# Patient Record
Sex: FEMALE | Race: WHITE | HISPANIC OR LATINO | Employment: FULL TIME | URBAN - METROPOLITAN AREA
[De-identification: names, ages, dates, MRNs, and addresses within clinical notes are randomized per-mention and may not be internally consistent; named-entity substitution may affect disease eponyms.]

---

## 2021-03-24 PROCEDURE — 96365 THER/PROPH/DIAG IV INF INIT: CPT

## 2021-03-24 PROCEDURE — 99284 EMERGENCY DEPT VISIT MOD MDM: CPT

## 2021-03-24 PROCEDURE — 96375 TX/PRO/DX INJ NEW DRUG ADDON: CPT

## 2021-03-25 ENCOUNTER — HOSPITAL ENCOUNTER (EMERGENCY)
Facility: MEDICAL CENTER | Age: 36
End: 2021-03-25
Attending: EMERGENCY MEDICINE
Payer: MEDICAID

## 2021-03-25 VITALS
BODY MASS INDEX: 37.94 KG/M2 | HEART RATE: 97 BPM | DIASTOLIC BLOOD PRESSURE: 85 MMHG | TEMPERATURE: 97.5 F | HEIGHT: 64 IN | WEIGHT: 222.22 LBS | OXYGEN SATURATION: 98 % | SYSTOLIC BLOOD PRESSURE: 123 MMHG | RESPIRATION RATE: 14 BRPM

## 2021-03-25 DIAGNOSIS — N30.00 ACUTE CYSTITIS WITHOUT HEMATURIA: ICD-10-CM

## 2021-03-25 LAB
ALBUMIN SERPL BCP-MCNC: 4.2 G/DL (ref 3.2–4.9)
ALBUMIN/GLOB SERPL: 1.4 G/DL
ALP SERPL-CCNC: 80 U/L (ref 30–99)
ALT SERPL-CCNC: 34 U/L (ref 2–50)
ANION GAP SERPL CALC-SCNC: 12 MMOL/L (ref 7–16)
APPEARANCE UR: ABNORMAL
AST SERPL-CCNC: 25 U/L (ref 12–45)
BACTERIA #/AREA URNS HPF: ABNORMAL /HPF
BASOPHILS # BLD AUTO: 0.2 % (ref 0–1.8)
BASOPHILS # BLD: 0.02 K/UL (ref 0–0.12)
BILIRUB SERPL-MCNC: 0.7 MG/DL (ref 0.1–1.5)
BILIRUB UR QL STRIP.AUTO: NEGATIVE
BUN SERPL-MCNC: 7 MG/DL (ref 8–22)
CALCIUM SERPL-MCNC: 9.6 MG/DL (ref 8.5–10.5)
CHLORIDE SERPL-SCNC: 97 MMOL/L (ref 96–112)
CO2 SERPL-SCNC: 25 MMOL/L (ref 20–33)
COLOR UR: YELLOW
CREAT SERPL-MCNC: 0.75 MG/DL (ref 0.5–1.4)
EOSINOPHIL # BLD AUTO: 0.1 K/UL (ref 0–0.51)
EOSINOPHIL NFR BLD: 1.1 % (ref 0–6.9)
EPI CELLS #/AREA URNS HPF: ABNORMAL /HPF
ERYTHROCYTE [DISTWIDTH] IN BLOOD BY AUTOMATED COUNT: 39.8 FL (ref 35.9–50)
GLOBULIN SER CALC-MCNC: 3.1 G/DL (ref 1.9–3.5)
GLUCOSE SERPL-MCNC: 102 MG/DL (ref 65–99)
GLUCOSE UR STRIP.AUTO-MCNC: NEGATIVE MG/DL
HCG SERPL QL: NEGATIVE
HCT VFR BLD AUTO: 45.8 % (ref 37–47)
HGB BLD-MCNC: 15.2 G/DL (ref 12–16)
HYALINE CASTS #/AREA URNS LPF: ABNORMAL /LPF
IMM GRANULOCYTES # BLD AUTO: 0.03 K/UL (ref 0–0.11)
IMM GRANULOCYTES NFR BLD AUTO: 0.3 % (ref 0–0.9)
KETONES UR STRIP.AUTO-MCNC: 15 MG/DL
LACTATE BLD-SCNC: 1.4 MMOL/L (ref 0.5–2)
LEUKOCYTE ESTERASE UR QL STRIP.AUTO: ABNORMAL
LYMPHOCYTES # BLD AUTO: 3.23 K/UL (ref 1–4.8)
LYMPHOCYTES NFR BLD: 36.4 % (ref 22–41)
MCH RBC QN AUTO: 27.5 PG (ref 27–33)
MCHC RBC AUTO-ENTMCNC: 33.2 G/DL (ref 33.6–35)
MCV RBC AUTO: 82.8 FL (ref 81.4–97.8)
MICRO URNS: ABNORMAL
MONOCYTES # BLD AUTO: 0.57 K/UL (ref 0–0.85)
MONOCYTES NFR BLD AUTO: 6.4 % (ref 0–13.4)
NEUTROPHILS # BLD AUTO: 4.92 K/UL (ref 2–7.15)
NEUTROPHILS NFR BLD: 55.6 % (ref 44–72)
NITRITE UR QL STRIP.AUTO: POSITIVE
NRBC # BLD AUTO: 0 K/UL
NRBC BLD-RTO: 0 /100 WBC
PH UR STRIP.AUTO: 7.5 [PH] (ref 5–8)
PLATELET # BLD AUTO: 334 K/UL (ref 164–446)
PMV BLD AUTO: 9.7 FL (ref 9–12.9)
POTASSIUM SERPL-SCNC: 3.3 MMOL/L (ref 3.6–5.5)
PROT SERPL-MCNC: 7.3 G/DL (ref 6–8.2)
PROT UR QL STRIP: NEGATIVE MG/DL
RBC # BLD AUTO: 5.53 M/UL (ref 4.2–5.4)
RBC # URNS HPF: ABNORMAL /HPF
RBC UR QL AUTO: NEGATIVE
SODIUM SERPL-SCNC: 134 MMOL/L (ref 135–145)
SP GR UR STRIP.AUTO: 1.02
UROBILINOGEN UR STRIP.AUTO-MCNC: 0.2 MG/DL
WBC # BLD AUTO: 8.9 K/UL (ref 4.8–10.8)
WBC #/AREA URNS HPF: ABNORMAL /HPF

## 2021-03-25 PROCEDURE — 81001 URINALYSIS AUTO W/SCOPE: CPT

## 2021-03-25 PROCEDURE — 87086 URINE CULTURE/COLONY COUNT: CPT

## 2021-03-25 PROCEDURE — 87077 CULTURE AEROBIC IDENTIFY: CPT | Mod: 91

## 2021-03-25 PROCEDURE — 83605 ASSAY OF LACTIC ACID: CPT

## 2021-03-25 PROCEDURE — 700105 HCHG RX REV CODE 258: Performed by: EMERGENCY MEDICINE

## 2021-03-25 PROCEDURE — 80053 COMPREHEN METABOLIC PANEL: CPT

## 2021-03-25 PROCEDURE — 84703 CHORIONIC GONADOTROPIN ASSAY: CPT

## 2021-03-25 PROCEDURE — 87186 SC STD MICRODIL/AGAR DIL: CPT

## 2021-03-25 PROCEDURE — 700111 HCHG RX REV CODE 636 W/ 250 OVERRIDE (IP): Performed by: EMERGENCY MEDICINE

## 2021-03-25 PROCEDURE — 85025 COMPLETE CBC W/AUTO DIFF WBC: CPT

## 2021-03-25 RX ORDER — SODIUM CHLORIDE 9 MG/ML
1000 INJECTION, SOLUTION INTRAVENOUS ONCE
Status: COMPLETED | OUTPATIENT
Start: 2021-03-25 | End: 2021-03-25

## 2021-03-25 RX ORDER — ONDANSETRON 2 MG/ML
4 INJECTION INTRAMUSCULAR; INTRAVENOUS ONCE
Status: COMPLETED | OUTPATIENT
Start: 2021-03-25 | End: 2021-03-25

## 2021-03-25 RX ORDER — CEFDINIR 300 MG/1
300 CAPSULE ORAL 2 TIMES DAILY
Qty: 14 CAPSULE | Refills: 0 | Status: SHIPPED | OUTPATIENT
Start: 2021-03-25 | End: 2021-04-01

## 2021-03-25 RX ORDER — ONDANSETRON 4 MG/1
4 TABLET, ORALLY DISINTEGRATING ORAL EVERY 6 HOURS PRN
Qty: 10 TABLET | Refills: 0 | Status: SHIPPED | OUTPATIENT
Start: 2021-03-25 | End: 2021-09-22

## 2021-03-25 RX ADMIN — ONDANSETRON 4 MG: 2 INJECTION INTRAMUSCULAR; INTRAVENOUS at 01:27

## 2021-03-25 RX ADMIN — SODIUM CHLORIDE 1000 ML: 9 INJECTION, SOLUTION INTRAVENOUS at 01:27

## 2021-03-25 RX ADMIN — CEFTRIAXONE SODIUM 2 G: 2 INJECTION, POWDER, FOR SOLUTION INTRAMUSCULAR; INTRAVENOUS at 01:55

## 2021-03-25 NOTE — DISCHARGE INSTRUCTIONS
You were seen in the ER for nausea and urinary tract infection.  We did give you a dose of antibiotics to your IV and I am giving a prescription for antibiotics to go home with.  Please take these as directed.  It will be important that you follow-up with your primary care physician, I gave you a list of local clinics we can do this, please call 1 later today to make a follow-up appointment.  Return to the ER with new or worsening symptoms.  Good luck, I hope you feel better soon!

## 2021-03-25 NOTE — ED TRIAGE NOTES
"Ann Singh Ase  35 y.o.  Chief Complaint   Patient presents with   • Dehydration     pt reports gastric sleeve 2 years ago; reports GI illness 2 months ago which \"shrunk her gastric pouch\"; pt reports she got a new job at [employer] where she isn't allowed to keep water with her; pt reports she continuously has to leave to go get water; pt reports constant nausea and acid reflux after eating; pt currently drinking a bottle of water in triage   • Urinary Frequency     pt reports she thinks she has a UTI r/t urinary frequency/urgency and intermittent R flank pain     "

## 2021-03-25 NOTE — LETTER
3/28/2021               Flor [facility]  [address] [facility]  Room [address] [place] [address]        Dear Flor (MR#[id number])    This letter is sent in regards to your, recent visit to the [facility] Emergency Department on 3/24/2021.  During the visit, tests were performed to assist the physician in a medical diagnosis.  A review of those tests requires that we notify you of the following:    Your urine culture was POSITIVE for a bacteria called Escherichia coli. The antibiotic prescribed for you (cefdinir) should be active to treat this bacteria. IT IS IMPORTANT THAT YOU CONTINUE TAKING YOUR ANTIBIOTIC UNTIL IT IS FINISHED.      Please feel free to contact me at the number below if you have any questions or concerns. Thank you for your cooperation in the matter.    Sincerely,  ED Culture Follow-Up Staff  Eliu Beltre PharmD    [facility], Emergency Department  [address] [place] [address]  [phone] (ED Culture Line)  [phone]

## 2021-03-25 NOTE — ED NOTES
Rounded on patient. Denies changes since triage note. Patient irritable she has yet to be seen, empathetic listening, apologized for wait times

## 2021-03-25 NOTE — ED PROVIDER NOTES
"ED Provider Note    CHIEF COMPLAINT  Chief Complaint   Patient presents with   • Dehydration     pt reports gastric sleeve 2 years ago; reports GI illness 2 months ago which \"shrunk her gastric pouch\"; pt reports she got a new job at [employer] where she isn't allowed to keep water with her; pt reports she continuously has to leave to go get water; pt reports constant nausea and acid reflux after eating; pt currently drinking a bottle of water in triage   • Urinary Frequency     pt reports she thinks she has a UTI r/t urinary frequency/urgency and intermittent R flank pain       HPI  Ann Camara is a 35 y.o. female who presents with a chief complaint of dehydration.  The patient reports she had a gastric sleeve procedure performed 2 years ago.  In early March prior to her move to the [facility], she developed a GI illness that \"shrunk my pouch\".  Since that time she has had to take multiple and frequent sips of water and is unable to drink large amounts of water to keep herself hydrated.  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  Her symptoms were associated with some nausea, dry heaving, and lightheadedness.  She then began to hyperventilate.  This has happened to her before when she was dehydrated.  In addition she complains of urinary frequency, urgency, and dysuria which is consistent with prior episodes of urinary tract infection.  No vaginal discharge or concern for STD.  She is still slightly nauseated but reports all other symptoms have resolved.    REVIEW OF SYSTEMS  See HPI for further details.  Dysuria.  Urinary frequency.  Urinary urgency.  Nausea.  Dry heaving.  Dehydration.  All other systems are negative.     PAST MEDICAL HISTORY   has a past medical history of PCOS (polycystic ovarian syndrome).    SOCIAL HISTORY  Social History     Tobacco Use   • Smoking status: Current Every Day Smoker   • Smokeless tobacco: Never Used " "  Substance and Sexual Activity   • Alcohol use: Not Currently   • Drug use: Never   • Sexual activity: Not on file       SURGICAL HISTORY   has a past surgical history that includes gastric banding laparoscopic; cholecystectomy; and dilation and curettage.    CURRENT MEDICATIONS  Home Medications    **Home medications have not yet been reviewed for this encounter**         ALLERGIES  Allergies   Allergen Reactions   • Tape      \"surgical tape\"       PHYSICAL EXAM  VITAL SIGNS: /93   Pulse 91   Temp 36.5 °C (97.7 °F) (Temporal)   Resp 18   Ht 1.626 m (5' 4\")   Wt 101 kg (222 lb 3.6 oz)   SpO2 99%   BMI 38.14 kg/m²    Pulse ox interpretation: I interpret this pulse ox as normal.  Constitutional: Alert in no apparent distress.  HENT: No signs of trauma, Bilateral external ears normal, Nose normal.  Dry mucous membranes.  Eyes: Pupils are equal and reactive, Conjunctiva normal, Non-icteric.   Neck: Normal range of motion, No tenderness, Supple, No stridor.   Lymphatic: No lymphadenopathy noted.   Cardiovascular: Regular rate and rhythm, no murmurs. Pulses symmetrical.  Thorax & Lungs: Normal breath sounds, No respiratory distress, No wheezing, No chest tenderness.   Abdomen: Bowel sounds normal, Soft, No tenderness, No masses, No pulsatile masses. No peritoneal signs.  Skin: Warm, Dry, No erythema, No rash.   Back: No bony tenderness, no CVA tenderness.   Extremities: Intact distal pulses, No edema, No tenderness, No cyanosis.  Musculoskeletal: Good range of motion in all major joints. No tenderness to palpation or major deformities noted.   Neurologic: Alert, Normal motor function, Normal sensory function, No focal deficits noted.   Psychiatric: Affect normal, Judgment normal, Mood normal.     DIAGNOSTIC STUDIES / PROCEDURES    LABS  Results for orders placed or performed during the hospital encounter of 03/25/21   URINALYSIS,CULTURE IF INDICATED    Specimen: Urine, Clean Catch   Result Value Ref Range    " Color Yellow     Character Cloudy (A)     Specific Gravity 1.021 <1.035    Ph 7.5 5.0 - 8.0    Glucose Negative Negative mg/dL    Ketones 15 (A) Negative mg/dL    Protein Negative Negative mg/dL    Bilirubin Negative Negative    Urobilinogen, Urine 0.2 Negative    Nitrite Positive (A) Negative    Leukocyte Esterase Small (A) Negative    Occult Blood Negative Negative    Micro Urine Req Microscopic    CBC WITH DIFFERENTIAL   Result Value Ref Range    WBC 8.9 4.8 - 10.8 K/uL    RBC 5.53 (H) 4.20 - 5.40 M/uL    Hemoglobin 15.2 12.0 - 16.0 g/dL    Hematocrit 45.8 37.0 - 47.0 %    MCV 82.8 81.4 - 97.8 fL    MCH 27.5 27.0 - 33.0 pg    MCHC 33.2 (L) 33.6 - 35.0 g/dL    RDW 39.8 35.9 - 50.0 fL    Platelet Count 334 164 - 446 K/uL    MPV 9.7 9.0 - 12.9 fL    Neutrophils-Polys 55.60 44.00 - 72.00 %    Lymphocytes 36.40 22.00 - 41.00 %    Monocytes 6.40 0.00 - 13.40 %    Eosinophils 1.10 0.00 - 6.90 %    Basophils 0.20 0.00 - 1.80 %    Immature Granulocytes 0.30 0.00 - 0.90 %    Nucleated RBC 0.00 /100 WBC    Neutrophils (Absolute) 4.92 2.00 - 7.15 K/uL    Lymphs (Absolute) 3.23 1.00 - 4.80 K/uL    Monos (Absolute) 0.57 0.00 - 0.85 K/uL    Eos (Absolute) 0.10 0.00 - 0.51 K/uL    Baso (Absolute) 0.02 0.00 - 0.12 K/uL    Immature Granulocytes (abs) 0.03 0.00 - 0.11 K/uL    NRBC (Absolute) 0.00 K/uL   HCG QUAL SERUM   Result Value Ref Range    Beta-Hcg Qualitative Serum Negative Negative   COMP METABOLIC PANEL   Result Value Ref Range    Sodium 134 (L) 135 - 145 mmol/L    Potassium 3.3 (L) 3.6 - 5.5 mmol/L    Chloride 97 96 - 112 mmol/L    Co2 25 20 - 33 mmol/L    Anion Gap 12.0 7.0 - 16.0    Glucose 102 (H) 65 - 99 mg/dL    Bun 7 (L) 8 - 22 mg/dL    Creatinine 0.75 0.50 - 1.40 mg/dL    Calcium 9.6 8.5 - 10.5 mg/dL    AST(SGOT) 25 12 - 45 U/L    ALT(SGPT) 34 2 - 50 U/L    Alkaline Phosphatase 80 30 - 99 U/L    Total Bilirubin 0.7 0.1 - 1.5 mg/dL    Albumin 4.2 3.2 - 4.9 g/dL    Total Protein 7.3 6.0 - 8.2 g/dL    Globulin 3.1 1.9  - 3.5 g/dL    A-G Ratio 1.4 g/dL   LACTIC ACID   Result Value Ref Range    Lactic Acid 1.4 0.5 - 2.0 mmol/L   URINE MICROSCOPIC (W/UA)   Result Value Ref Range    WBC 20-50 (A) /hpf    RBC 0-2 /hpf    Bacteria Many (A) None /hpf    Epithelial Cells Few /hpf    Hyaline Cast 6-10 (A) /lpf   ESTIMATED GFR   Result Value Ref Range    GFR If African American >60 >60 mL/min/1.73 m 2    GFR If Non African American >60 >60 mL/min/1.73 m 2     COURSE & MEDICAL DECISION MAKING  Pertinent Labs & Imaging studies reviewed. (See chart for details)  This is a 35-year-old female with history of gastric sleeve surgery who is here with reports of dehydration as well as symptoms consistent with urinary tract infection.  She arrives afebrile with normal vital signs.  She appears dehydrated with dry mucous membranes but nontoxic.  Physical exam is benign.  Abdomen is soft and nontender.  We will start with IV fluids for dehydration, obtain basic labs, try some Zofran for nausea, and obtain urinalysis to evaluate for UTI.    CBC without leukocytosis or left shift.  Metabolic panel reveals mild hyponatremia to 134, hypokalemia to 3.3, glucose of 102.  UA reveals ketones and does suggest infection.  hCG is negative.  Patient was given a dose of ceftriaxone through the IV.    Patient able to tolerate p.o. without difficulty.  She is safe for discharge with close outpatient management.  She will follow-up with her HR department to get a note that allows her to keep water with her in order to stay hydrated throughout the day.  She was given a prescription for Omnicef and Zofran.  Discharged in good and stable condition with strict return precautions.  Vital signs remain normal and stable.    The patient will not drink alcohol nor drive with prescribed medications. The patient will return for worsening symptoms and is stable at the time of discharge. The patient verbalizes understanding and will comply.    HYDRATION  HYDRATION: Based on the  patient's presentation of Dehydration the patient was given IV fluids. IV Hydration was used because oral hydration was not adequate alone. Upon recheck following hydration, the patient was Improved.    FINAL IMPRESSION  1. Acute cystitis without hematuria             Electronically signed by: Juni Cano M.D., 3/25/2021 12:58 AM

## 2021-03-28 LAB
BACTERIA UR CULT: ABNORMAL
BACTERIA UR CULT: ABNORMAL
SIGNIFICANT IND 70042: ABNORMAL
SITE SITE: ABNORMAL
SOURCE SOURCE: ABNORMAL

## 2021-03-28 NOTE — ED NOTES
"ED Positive Culture Follow-up/Notification Note:    Date: 3/28/21     Patient seen in the ED on 3/24/2021 for dehydration and urinary frequency/urgency/dysuria. Her sx were associated w/ some nausea, dry heaving, and lightheadedness. Pt has hx of UTI's in which she noted to be similar to her current sx. No vaginal discharge or concern for STD. Pt was AF upon presentation.  1. Acute cystitis without hematuria       Discharge Medication List as of 3/25/2021  2:22 AM      START taking these medications    Details   cefdinir (OMNICEF) 300 MG Cap Take 1 capsule by mouth 2 times a day for 7 days., Disp-14 capsule, R-0, Print Rx Paper      ondansetron (ZOFRAN ODT) 4 MG TABLET DISPERSIBLE Take 1 tablet by mouth every 6 hours as needed., Disp-10 tablet, R-0, Print Rx Paper           ED administered abx: CTX 2g IV x 1     Allergies: Tape     Vitals:    03/24/21 2054 03/24/21 2105 03/24/21 2325 03/25/21 0200   BP: 156/103  123/93 123/85   Pulse: (!) 120  91 97   Resp: 14  18 14   Temp: 36.1 °C (97 °F)  36.5 °C (97.7 °F) 36.4 °C (97.5 °F)   TempSrc: Temporal  Temporal Temporal   SpO2: 99%  99% 98%   Weight:  101 kg (222 lb 3.6 oz)     Height:  1.626 m (5' 4\")         Final cultures:   Results     Procedure Component Value Units Date/Time    URINE CULTURE(NEW) [[id number]]  (Abnormal)  (Susceptibility) Collected: 03/25/21 0105    Order Status: Completed Specimen: Urine Updated: 03/28/21 1231     Significant Indicator POS     Source UR     Site -     Culture Result -      Escherichia coli  >100,000 cfu/mL      Narrative:      Indication for culture:->Patient WITHOUT an indwelling Melgar  catheter in place with new onset of Dysuria, Frequency,  Urgency, and/or Suprapubic pain    Susceptibility     Escherichia coli (1)     Antibiotic Interpretation Microscan Method Status    Amikacin [*]  Sensitive <=16 mcg/mL ORSANNA Final    Ampicillin Resistant >16 mcg/mL ROSANNA Final    Amoxicillin/CA [*]  Sensitive <=8/4 mcg/mL ROSANNA Final    Aztreonam " [*]  Sensitive <=4 mcg/mL ROSANNA Final    Ceftolozane+Tazobactam [*]  Sensitive <=2 mcg/mL ROSANNA Final    Ceftriaxone Sensitive <=1 mcg/mL ROSANNA Final    Ceftazidime Sensitive <=1 mcg/mL ROSANNA Final    Cefotaxime Sensitive <=2 mcg/mL ROSANNA Final    Cefazolin Intermediate 16 mcg/mL ROSANNA Final    Ciprofloxacin Sensitive <=0.25 mcg/mL ROSANNA Final    Cefepime Sensitive <=2 mcg/mL ROSANNA Final    Cefuroxime Sensitive 8 mcg/mL ROSANNA Final    Ampicillin/sulbactam Resistant >16/8 mcg/mL ROSANNA Final    Ceftazidime+Avibactam [*]  Sensitive <=4 mcg/mL ROSANNA Final    Tobramycin Sensitive <=2 mcg/mL ROSANNA Final    Ertapenem [*]  Sensitive <=0.5 mcg/mL ROSANNA Final    Nitrofurantoin Sensitive <=32 mcg/mL ROSANNA Final    Gentamicin Sensitive <=2 mcg/mL ROSANNA Final    Imipenem [*]  Sensitive <=1 mcg/mL ROSANNA Final    Levofloxacin Sensitive <=0.5 mcg/mL ROSANNA Final    Meropenem [*]  Sensitive <=1 mcg/mL ROSANNA Final    Meropenem/Vaborbactam [*]  Sensitive <=2 mcg/mL ROSANNA Final    Pip/Tazobactam Sensitive <=8 mcg/mL ROSANNA Final    Trimeth/Sulfa Sensitive <=0.5/9.5 mcg/mL ROSANNA Final    Tetracycline [*]  Sensitive <=4 mcg/mL ROSANNA Final    Tigecycline Sensitive <=2 mcg/mL ROSANNA Final           [*]   Suppressed Antibiotic                 URINALYSIS,CULTURE IF INDICATED [[id number]]  (Abnormal) Collected: 03/25/21 0105    Order Status: Completed Specimen: Urine, Clean Catch Updated: 03/25/21 0147     Color Yellow     Character Cloudy     Specific Gravity 1.021     Ph 7.5     Glucose Negative mg/dL      Ketones 15 mg/dL      Protein Negative mg/dL      Bilirubin Negative     Urobilinogen, Urine 0.2     Nitrite Positive     Leukocyte Esterase Small     Occult Blood Negative     Micro Urine Req Microscopic    Narrative:      Indication for culture:->Patient WITHOUT an indwelling Melgar  catheter in place with new onset of Dysuria, Frequency,  Urgency, and/or Suprapubic pain          Plan:   Appropriate antibiotic therapy prescribed. No changes required based upon culture result.  Sent  letter to patient to notify of positive culture result and encourage compliance with prescribed antibiotics.     Eliu Beltre, CristianeD

## 2021-05-15 ENCOUNTER — APPOINTMENT (OUTPATIENT)
Dept: RADIOLOGY | Facility: IMAGING CENTER | Age: 36
End: 2021-05-15
Attending: FAMILY MEDICINE
Payer: COMMERCIAL

## 2021-05-15 ENCOUNTER — OCCUPATIONAL MEDICINE (OUTPATIENT)
Dept: URGENT CARE | Facility: PHYSICIAN GROUP | Age: 36
End: 2021-05-15
Payer: COMMERCIAL

## 2021-05-15 VITALS
TEMPERATURE: 97.8 F | HEART RATE: 98 BPM | RESPIRATION RATE: 18 BRPM | SYSTOLIC BLOOD PRESSURE: 124 MMHG | OXYGEN SATURATION: 97 % | HEIGHT: 64 IN | WEIGHT: 204 LBS | DIASTOLIC BLOOD PRESSURE: 76 MMHG | BODY MASS INDEX: 34.83 KG/M2

## 2021-05-15 DIAGNOSIS — S80.02XA CONTUSION OF LEFT KNEE, INITIAL ENCOUNTER: ICD-10-CM

## 2021-05-15 DIAGNOSIS — S83.92XA SPRAIN OF LEFT KNEE, UNSPECIFIED LIGAMENT, INITIAL ENCOUNTER: ICD-10-CM

## 2021-05-15 PROCEDURE — 99203 OFFICE O/P NEW LOW 30 MIN: CPT | Performed by: FAMILY MEDICINE

## 2021-05-15 PROCEDURE — 73564 X-RAY EXAM KNEE 4 OR MORE: CPT | Mod: TC,FY,LT | Performed by: FAMILY MEDICINE

## 2021-05-15 RX ORDER — MULTIVITAMIN,THER AND MINERALS
1 TABLET ORAL DAILY
COMMUNITY
End: 2021-09-22

## 2021-05-15 ASSESSMENT — PAIN SCALES - GENERAL: PAINLEVEL: 9=SEVERE PAIN

## 2021-05-15 ASSESSMENT — FIBROSIS 4 INDEX: FIB4 SCORE: 0.47

## 2021-05-15 NOTE — LETTER
"EMPLOYEE’S CLAIM FOR COMPENSATION/ REPORT OF INITIAL TREATMENT  FORM C-4    EMPLOYEE’S CLAIM - PROVIDE ALL INFORMATION REQUESTED   First Name  Flor Last Name  Francisco Camara Birthdate                    1985                Sex  female Claim Number   Home Address  [address]  [address] Age  35 y.o. 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 (if applicable)  Making bed    How did this injury or occupational disease occur? (Be specific an answer in detail. Use additional sheet if necessary)  Was making the bed and tripped over the sheet and landed on my knee   If you believe that you have an occupational disease, when did you first have knowledge of the disability and it relationship to your employment?  NA Witnesses to the Accident  None      Nature of Injury or Occupational Disease  Workers' Compensation  Part(s) of Body Injured or Affected  Knee (L), N/A, N/A    I certify that the above is true and correct to the best of my knowledge and that I have provided this information in order to obtain the benefits of [place]’s " [place]  I hereby authorize any physician, chiropractor, surgeon, practitioner, or other person, any hospital, including [facility] or [facility], any medical service organization, any insurance company, or other institution or organization to release to each other, any medical or other information, including benefits paid or payable, pertinent to this injury or disease, except information relative to diagnosis, treatment and/or counseling for AIDS, psychological conditions, alcohol or controlled substances, for which I must give specific authorization.  A Photostat of this authorization shall be as valid as the original.     Date   Place   Employee’s Signature   THIS REPORT MUST BE COMPLETED AND MAILED WITHIN 3 WORKING DAYS OF TREATMENT   Place  [facility]  Name of Facility  [place]   Date  5/15/2021 Diagnosis  (S80.02XA) Contusion of left knee, initial encounter  (S83.92XA) Sprain of left knee, unspecified ligament, initial encounter Is there evidence the injured employee was under the              influence of alcohol and/or another controlled substance at the time of accident?   Hour  10:17 AM Description of Injury or Disease  Diagnoses of Contusion of left knee, initial encounter and Sprain of left knee, unspecified ligament, initial encounter were pertinent to this visit. No   Treatment  Hinged knee brace provided to use on left knee if needed. Diclofenac 1% gel prescribed to use for pain for anti-inflammatory effect.  Have you advised the patient to remain off work five days or     more? No   X-Ray Findings  Negative  Comments:Left knee x-rays: Unremarkable knee series.   If Yes   From Date  To Date      From information given by the employee, together with medical evidence, can you directly connect this injury or occupational disease as job incurred?  Yes If No Full Duty     "  No Modified Duty  Yes   Is additional medical care by a physician indicated?  Yes  Comments:Return on 5/22/21 or sooner if needed. If Modified Duty, Specify any Limitations / Restrictions  Sedentary work only is recommended. Wear hinged knee brace on left knee if needed.   Do you know of any previous injury or disease contributing to this condition or occupational disease?                            No   Date  5/15/2021 Print Doctor’s Name   Ronnell Almanza M.D. I certify the employer’s copy of  this form was mailed on:   Address  [address] Insurer’s Use Only     [place] Zip  [address]    Provider’s Tax ID Number  [id number] Telephone  Dept: [phone]      e-RONNELL Gomez M.D.  Signature:     Degree          ORIGINAL-TREATING PHYSICIAN OR CHIROPRACTOR    PAGE 2-INSURER/TPA    PAGE 3-EMPLOYER    PAGE 4-EMPLOYEE        Form C-4 (rev.10/07)           BRIEF DESCRIPTION OF RIGHTS AND BENEFITS  (Pursuant to [place] 616[place])    Notice of Injury or Occupational Disease (Incident Report Form C-1): If an injury or occupational disease (OD) arises out of and in the course of employment, you must provide written notice to your employer as soon as practicable, but no later than 7 days after the accident or OD. Your employer shall maintain a sufficient supply of the required forms.    Claim for Compensation (Form C-4): If medical treatment is sought, the form C-4 is available at the place of initial treatment. A completed \"Claim for Compensation\" (Form C-4) must be filed within 90 days after an accident or OD. The treating physician or chiropractor must, within 3 working days after treatment, complete and mail to the employer, the employer's insurer and third-party , the Claim for Compensation.    Medical Treatment: If you require medical treatment for your on-the-job injury or OD, you may be required to select a physician or chiropractor from a list provided by your workers’ " compensation insurer, if it has contracted with an Organization for Managed Care (MCO) or Preferred Provider Organization (PPO) or providers of health care. If your employer has not entered into a contract with an MCO or PPO, you may select a physician or chiropractor from the Panel of Physicians and Chiropractors. Any medical costs related to your industrial injury or OD will be paid by your insurer.    Temporary Total Disability (TTD): If your doctor has certified that you are unable to work for a period of at least 5 consecutive days, or 5 cumulative days in a 20-day period, or places restrictions on you that your employer does not accommodate, you may be entitled to TTD compensation.    Temporary Partial Disability (TPD): If the wage you receive upon reemployment is less than the compensation for TTD to which you are entitled, the insurer may be required to pay you TPD compensation to make up the difference. TPD can only be paid for a maximum of 24 months.    Permanent Partial Disability (PPD): When your medical condition is stable and there is an indication of a PPD as a result of your injury or OD, within 30 days, your insurer must arrange for an evaluation by a rating physician or chiropractor to determine the degree of your PPD. The amount of your PPD award depends on the date of injury, the results of the PPD evaluation, your age and wage.    Permanent Total Disability (PTD): If you are medically certified by a treating physician or chiropractor as permanently and totally disabled and have been granted a PTD status by your insurer, you are entitled to receive monthly benefits not to exceed 66 2/3% of your average monthly wage. The amount of your PTD payments is subject to reduction if you previously received a lump-sum PPD award.    Vocational Rehabilitation Services: You may be eligible for vocational rehabilitation services if you are unable to return to the job due to a permanent physical impairment or  permanent restrictions as a result of your injury or occupational disease.    Transportation and Per Dede Reimbursement: You may be eligible for travel expenses and per dede associated with medical treatment.    Reopening: You may be able to reopen your claim if your condition worsens after claim closure.     Appeal Process: If you disagree with a written determination issued by the insurer or the insurer does not respond to your request, you may appeal to the Department of Administration, , by following the instructions contained in your determination letter. You must appeal the determination within 70 days from the date of the determination letter at [address], [address], [place], [place] [address], or [address], [address], [place], [place] [address]. If you disagree with the  decision, you may appeal to the Department of Administration, . You must file your appeal within 30 days from the date of the  decision letter at [address], [address], [place], [place] [address], or [address], [address], [place], [place] [address]. If you disagree with a decision of an , you may file a petition for judicial review with the District Court. You must do so within 30 days of the Appeal Officer’s decision. You may be represented by an  at your own expense or you may contact the [facility] for possible representation.    [place]  for Injured Workers (NAIW): If you disagree with a  decision, you may request that NAIW represent you without charge at an  Hearing. For information regarding denial of benefits, you may contact the [facility] at: [address], [address], [place] [address], [phone], or [address], [address], [place] [address], [phone]    To File a Complaint with the Division: If you wish to file a complaint with the  of the  Division of Industrial Relations (DIR),  please contact the Workers’ Compensation Section, [address], [address], [place], [place] [address], telephone [phone], or [address], [address], [place], [place] [address], telephone [phone].    For assistance with Workers’ Compensation Issues: You may contact the [facility] Office for Consumer Health Assistance, [address], [address], [place], [place] [address], Toll Free 1-578.175.5471, Web site: http://[facility].[place].gov/Programs/ANTWAN E-mail: antwan@[facility].[place].gov              __________________________________________________________________                                    _________________            Employee Name / Signature                                                                                                                            Date                                                                                                                                                                                                                              D-2 (rev. 10/20)

## 2021-05-15 NOTE — PROGRESS NOTES
Chief Complaint:    Chief Complaint   Patient presents with   • Knee Pain     WC new-tripped over sheet and landed on her (L) knee-severe knee pain        History of Present Illness:    DOI: 5/9/21. She was making a bed, tripped over a sheet, and landed on left knee. Cannot take oral NSAIDs due to history of gastric bypass. OTC meds have been sub-optimal for help. Took previously prescribed Norco which helped a little. Physical work aggravates left knee. Left knee feels unstable sometimes.      Past Medical History:    Past Medical History:   Diagnosis Date   • PCOS (polycystic ovarian syndrome)      Past Surgical History:    Past Surgical History:   Procedure Laterality Date   • CHOLECYSTECTOMY     • DILATION AND CURETTAGE     • GASTRIC BANDING LAPAROSCOPIC       Social History:    Social History     Socioeconomic History   • Marital status:      Spouse name: Not on file   • Number of children: Not on file   • Years of education: Not on file   • Highest education level: Not on file   Occupational History   • Not on file   Tobacco Use   • Smoking status: Current Every Day Smoker   • Smokeless tobacco: Never Used   Substance and Sexual Activity   • Alcohol use: Not Currently   • Drug use: Never   • Sexual activity: Not on file   Other Topics Concern   • Not on file   Social History Narrative   • Not on file     Social Determinants of Health     Financial Resource Strain:    • Difficulty of Paying Living Expenses:    Food Insecurity:    • Worried About Running Out of Food in the Last Year:    • Ran Out of Food in the Last Year:    Transportation Needs:    • Lack of Transportation (Medical):    • Lack of Transportation (Non-Medical):    Physical Activity:    • Days of Exercise per Week:    • Minutes of Exercise per Session:    Stress:    • Feeling of Stress :    Social Connections:    • Frequency of Communication with Friends and Family:    • Frequency of Social Gatherings with Friends and Family:    • Attends  "Yazidism Services:    • Active Member of Clubs or Organizations:    • Attends Club or Organization Meetings:    • Marital Status:    Intimate Partner Violence:    • Fear of Current or Ex-Partner:    • Emotionally Abused:    • Physically Abused:    • Sexually Abused:      Family History:    History reviewed. No pertinent family history.    Medications:    Current Outpatient Medications on File Prior to Visit   Medication Sig Dispense Refill   • Vitamins/Minerals Tab Take 1 tablet by mouth every day.     • ondansetron (ZOFRAN ODT) 4 MG TABLET DISPERSIBLE Take 1 tablet by mouth every 6 hours as needed. 10 tablet 0     No current facility-administered medications on file prior to visit.     Allergies:    Allergies   Allergen Reactions   • Tape      \"surgical tape\"       Vitals:    Vitals:    05/15/21 1018   BP: 124/76   Pulse: 98   Resp: 18   Temp: 36.6 °C (97.8 °F)   SpO2: 97%   Weight: 92.5 kg (204 lb)   Height: 1.626 m (5' 4\")       Physical Exam:    Constitutional: Vital signs reviewed. Appears well-developed and well-nourished. No acute distress.   Eyes: Sclera white, conjunctivae clear.  ENT: External ears normal. Hearing normal.  Pulmonary/Chest: Respirations non-labored.  Musculoskeletal: Left knee is tender to palpation laterally and posteriorly. Left knee has decreased flexion and extension due to pain.  Neurological: Alert and oriented to person, place, and time. Muscle tone normal. Coordination normal. Light touch and sensation normal.  Skin: No rashes or lesions. Warm, dry, normal turgor.  Psychiatric: Normal mood and affect. Behavior is normal. Judgment and thought content normal.       Diagnostics:    DX-KNEE COMPLETE 4+ LEFT  Order: [id number]  Status:  Final result   Visible to patient:  No (scheduled for 5/16/2021  8:59 AM) Next appt:  None Dx:  Contusion of left knee, initial encou...   0 Result Notes  Details    Reading Physician Reading Date Result Priority   Liam Kelley M.D.  [phone] " 5/15/2021 Urgent Care      Narrative & Impression        HISTORY/REASON FOR EXAM:  Pain/Deformity Following Trauma; Room 3. She was making a bed, tripped over a sheet, and landed on left knee on 5/9/21.        TECHNIQUE/EXAM DESCRIPTION AND NUMBER OF VIEWS:  4 views of the left knee, 5/15/2021 10:43 AM.     COMPARISON: None     FINDINGS:     The alignment of the knee is within normal limits.  There is no definite joint effusion.  There is no evidence of displaced fracture or dislocation.  There is no focal swelling.              IMPRESSION:     Unremarkable knee series.        I personally reviewed the images. Rad report reviewed with her and copy of report to her.      Assessment / Plan:    1. Contusion of left knee, initial encounter  - DX-KNEE COMPLETE 4+ LEFT; Future  - diclofenac sodium 1 % Gel; APPLY 2-4 GRAMS TO PAINFUL AREA OF LEFT KNEE UP TO EVERY 6 HOURS ONLY IF NEEDED FOR PAIN OR SWELLING FOR ANTI-INFLAMMATORY EFFECT.  Dispense: 100 g; Refill: 1    2. Sprain of left knee, unspecified ligament, initial encounter  - diclofenac sodium 1 % Gel; APPLY 2-4 GRAMS TO PAINFUL AREA OF LEFT KNEE UP TO EVERY 6 HOURS ONLY IF NEEDED FOR PAIN OR SWELLING FOR ANTI-INFLAMMATORY EFFECT.  Dispense: 100 g; Refill: 1      Discussed with her DDX, management options, and risks, benefits, and alternatives to treatment plan agreed upon.    Work restrictions: Sedentary work only is recommended. Wear hinged knee brace on left knee if needed.    Hinged knee brace provided to use on left knee if needed.    Diclofenac 1% gel prescribed to use for pain for anti-inflammatory effect.    Return on 5/22/21 or sooner if needed.

## 2021-05-15 NOTE — LETTER
[facility] [place] [place] [address]  Phone:  [phone] - Fax:  [phone]   [facility] Progress Report and Disability Certification  Date of Service: 5/15/2021   No Show:  No  Date / Time of Next Visit: 5/22/2021   Claim Information   Patient Name: Flor Singh [facility]  Claim Number:     Employer: [employer] [facility] [employer]  Date of Injury: 5/9/2021     Insurer / TPA: [employer] [facility]  ID / SSN:     Occupation: Maid  Diagnosis: Diagnoses of Contusion of left knee, initial encounter and Sprain of left knee, unspecified ligament, initial encounter were pertinent to this visit.    Medical Information   Related to Industrial Injury? Yes    Subjective Complaints:  DOI: 5/9/21. She was making a bed, tripped over a sheet, and landed on left knee. Cannot take oral NSAIDs due to history of gastric bypass. OTC meds have been sub-optimal for help. Took previously prescribed Norco which helped a little. Physical work aggravates left knee. Left knee feels unstable sometimes.   Objective Findings: Left knee is tender to palpation laterally and posteriorly. Left knee has decreased flexion and extension due to pain.   Pre-Existing Condition(s): None.   Assessment:   Initial Visit    Status: Additional Care Required  Comments:Return on 5/22/21 or sooner if needed.  Permanent Disability:No    Plan: Medication  Comments:Diclofenac 1% gel prescribed to use for pain for anti-inflammatory effect.    Diagnostics: X-ray  Comments:Left knee x-rays: Unremarkable knee series.    Comments:  Hinged knee brace provided to use on left knee if needed.    Disability Information   Status: Released to Restricted Duty    From:  5/15/2021  Through: 5/22/2021 Restrictions are: Temporary   Physical Restrictions   Sitting:    Standing:    Stooping:    Bending:      Squatting:    Walking:    Climbing:    Pushing:      Pulling:    Other:    Reaching  Above Shoulder (L):   Reaching Above Shoulder (R):       Reaching Below Shoulder (L):    Reaching Below Shoulder (R):      Not to exceed Weight Limits   Carrying(hrs):   Weight Limit(lb):   Lifting(hrs):   Weight  Limit(lb):     Comments: Sedentary work only is recommended. Wear hinged knee brace on left knee if needed.    Repetitive Actions   Hands: i.e. Fine Manipulations from Grasping:     Feet: i.e. Operating Foot Controls:     Driving / Operate Machinery:     Provider Name:   Ronnell Almanza M.D. Physician Signature:  Physician Name:     Clinic Name / Location: [address] [address] Clinic Phone Number: Dept: [phone]   Appointment Time: 10:00 Am Visit Start Time: 10:17 AM   Check-In Time:  10:07 Am Visit Discharge Time: 11:18 AM    Original-Treating Physician or Chiropractor    Page 2-Insurer/TPA    Page 3-Employer    Page 4-Employee

## 2021-09-22 ENCOUNTER — OFFICE VISIT (OUTPATIENT)
Dept: URGENT CARE | Facility: PHYSICIAN GROUP | Age: 36
End: 2021-09-22
Payer: MEDICAID

## 2021-09-22 VITALS
OXYGEN SATURATION: 97 % | RESPIRATION RATE: 12 BRPM | WEIGHT: 184 LBS | DIASTOLIC BLOOD PRESSURE: 62 MMHG | SYSTOLIC BLOOD PRESSURE: 100 MMHG | HEART RATE: 78 BPM | TEMPERATURE: 97.8 F | BODY MASS INDEX: 27.89 KG/M2 | HEIGHT: 68 IN

## 2021-09-22 DIAGNOSIS — M62.830 SPASM OF MUSCLE OF LOWER BACK: ICD-10-CM

## 2021-09-22 DIAGNOSIS — M62.838 CERVICAL PARASPINAL MUSCLE SPASM: ICD-10-CM

## 2021-09-22 DIAGNOSIS — M51.36 DDD (DEGENERATIVE DISC DISEASE), LUMBAR: ICD-10-CM

## 2021-09-22 PROCEDURE — 99213 OFFICE O/P EST LOW 20 MIN: CPT | Performed by: PHYSICIAN ASSISTANT

## 2021-09-22 RX ORDER — CYCLOBENZAPRINE HCL 10 MG
10 TABLET ORAL 3 TIMES DAILY PRN
Qty: 30 TABLET | Refills: 0 | Status: SHIPPED | OUTPATIENT
Start: 2021-09-22

## 2021-09-22 RX ORDER — METHYLPREDNISOLONE 4 MG/1
TABLET ORAL
Qty: 21 TABLET | Refills: 0 | Status: SHIPPED | OUTPATIENT
Start: 2021-09-22

## 2021-09-22 ASSESSMENT — FIBROSIS 4 INDEX: FIB4 SCORE: 0.49

## 2021-09-22 ASSESSMENT — ENCOUNTER SYMPTOMS
BACK PAIN: 1
NECK PAIN: 1

## 2021-09-22 NOTE — PROGRESS NOTES
Subjective     Flor Camara is a 36 y.o. female who presents with Back Pain (pt states she is supposed to go to pain management but hasnt set up a PCP since moving here) and Neck Pain    Medications:    • This patient does not have an active medication from one of the medication groupers.    Allergies: Tape    Problem List: Flor Camara does not have a problem list on file.    Surgical History:  Past Surgical History:   Procedure Laterality Date   • CHOLECYSTECTOMY     • DILATION AND CURETTAGE     • GASTRIC BANDING LAPAROSCOPIC         Past Social Hx: Flor Camara  reports that she has been smoking. She has never used smokeless tobacco. She reports previous alcohol use. She reports that she does not use drugs.     Past Family Hx:  Flor Camara family history is not on file.     Problem list, medications, and allergies reviewed by myself today in Epic.          Patient presents with:  Back Pain: pt states she is supposed to go to pain management but hasnt set up a PCP since moving here.  Pt has hx of DDD in low back , upper back. And neck. Pt has taken otc nsaids with little relief.    Neck Pain        Back Pain  This is a new problem. The current episode started more than 1 month ago. The problem occurs constantly. The problem is unchanged. The pain is present in the lumbar spine. The quality of the pain is described as aching and cramping. The pain does not radiate. The pain is at a severity of 7/10. The pain is the same all the time. The symptoms are aggravated by sitting, position, standing and twisting. Stiffness is present all day. Pertinent negatives include no bladder incontinence, bowel incontinence, fever, paresthesias or tingling. Risk factors include obesity. She has tried heat, home exercises and NSAIDs for the symptoms. The treatment provided no relief.       Review of Systems   Constitutional: Negative for fever.  "  Gastrointestinal: Negative for bowel incontinence.   Genitourinary: Negative for bladder incontinence.   Musculoskeletal: Positive for back pain, myalgias and neck pain. Negative for falls and joint pain.   Neurological: Negative for tingling, sensory change, focal weakness and paresthesias.   All other systems reviewed and are negative.             Objective     /62   Pulse 78   Temp 36.6 °C (97.8 °F)   Resp 12   Ht 1.727 m (5' 8\")   Wt 83.5 kg (184 lb)   SpO2 97%   BMI 27.98 kg/m²      Physical Exam  Vitals and nursing note reviewed. Exam conducted with a chaperone present.   Constitutional:       General: She is not in acute distress.     Appearance: Normal appearance. She is well-developed. She is obese. She is not ill-appearing or toxic-appearing.   HENT:      Head: Normocephalic and atraumatic.      Right Ear: Tympanic membrane normal.      Left Ear: Tympanic membrane normal.      Nose: Nose normal.      Mouth/Throat:      Lips: Pink.      Mouth: Mucous membranes are moist.      Pharynx: Oropharynx is clear. Uvula midline.   Eyes:      Extraocular Movements: Extraocular movements intact.      Conjunctiva/sclera: Conjunctivae normal.      Pupils: Pupils are equal, round, and reactive to light.   Cardiovascular:      Rate and Rhythm: Normal rate and regular rhythm.      Pulses: Normal pulses.      Heart sounds: Normal heart sounds.   Pulmonary:      Effort: Pulmonary effort is normal.      Breath sounds: Normal breath sounds.   Abdominal:      General: Bowel sounds are normal.      Palpations: Abdomen is soft.   Musculoskeletal:      Cervical back: Normal range of motion and neck supple. Spasms and tenderness present. No swelling, deformity, rigidity, torticollis, bony tenderness or crepitus. Pain with movement present. Normal range of motion.      Thoracic back: No bony tenderness.      Lumbar back: Spasms and tenderness present. No deformity or bony tenderness. Decreased range of motion.        " Back:    Skin:     General: Skin is warm and dry.      Capillary Refill: Capillary refill takes less than 2 seconds.   Neurological:      General: No focal deficit present.      Mental Status: She is alert and oriented to person, place, and time.      Cranial Nerves: No cranial nerve deficit.      Motor: Motor function is intact.      Coordination: Coordination is intact.      Gait: Gait normal.   Psychiatric:         Mood and Affect: Mood normal.                   Assessment & Plan             1. Spasm of muscle of lower back  cyclobenzaprine (FLEXERIL) 10 mg Tab    methylPREDNISolone (MEDROL DOSEPAK) 4 MG Tablet Therapy Pack    REFERRAL TO PAIN MANAGEMENT   2. Cervical paraspinal muscle spasm  cyclobenzaprine (FLEXERIL) 10 mg Tab    methylPREDNISolone (MEDROL DOSEPAK) 4 MG Tablet Therapy Pack    REFERRAL TO PAIN MANAGEMENT   3. DDD (degenerative disc disease), lumbar  cyclobenzaprine (FLEXERIL) 10 mg Tab    methylPREDNISolone (MEDROL DOSEPAK) 4 MG Tablet Therapy Pack    REFERRAL TO PAIN MANAGEMENT     Patient was evaluated in clinic today while wearing appropriate personal protective equipment.      Patient to begin prescription today for Medrol Dosepak as well as Flexeril.PT instructed not to drive or operate heavy machinery or drink alcohol while taking this medication because it contains either a narcotic, benzodiazepines or muscle relaxant which causes drowsiness. PT verbalized understanding of these instructions.     [facility] Aware web site evaluation: I have obtained and reviewed patient utilization report from [facility] pharmacy database prior to writing prescription for controlled substance.  No history of abuse.    Patient is aware that she will not be prescribed any narcotic medication for pain.  Patient verbalizes understanding and agreement with this.    Patient given referral for pain management.    PT should follow up with PCP in 1-2 days for re-evaluation if symptoms have not improved.       Discussed red flags and reasons to return to UC or ED.      Pt and/or family verbalized understanding of diagnosis and follow up instructions and was offered informational handout on diagnosis.  PT discharged.

## 2021-09-22 NOTE — LETTER
September 22, 2021         Patient: Flor Camara   YOB: 1985   Date of Visit: 9/22/2021           To Whom it May Concern:    Flor Camara was seen in my clinic on 9/22/2021. She may return to work on 9/27/2021.    If you have any questions or concerns, please don't hesitate to call.        Sincerely,           Hodan Alejo P.A.-C.  Electronically Signed

## 2021-09-30 PROBLEM — E66.01 MORBID OBESITY DUE TO EXCESS CALORIES (HCC): Status: ACTIVE | Noted: 2019-08-20

## 2021-09-30 PROBLEM — E66.9 DIABETES MELLITUS TYPE 2 IN OBESE: Status: ACTIVE | Noted: 2019-08-11

## 2021-09-30 PROBLEM — E11.69 DIABETES MELLITUS TYPE 2 IN OBESE: Status: ACTIVE | Noted: 2019-08-11

## 2021-09-30 ASSESSMENT — ENCOUNTER SYMPTOMS
MYALGIAS: 1
PARESTHESIAS: 0
BOWEL INCONTINENCE: 0
SENSORY CHANGE: 0
FOCAL WEAKNESS: 0
FEVER: 0
TINGLING: 0
FALLS: 0